# Patient Record
Sex: FEMALE | Race: WHITE | NOT HISPANIC OR LATINO | Employment: STUDENT | URBAN - METROPOLITAN AREA
[De-identification: names, ages, dates, MRNs, and addresses within clinical notes are randomized per-mention and may not be internally consistent; named-entity substitution may affect disease eponyms.]

---
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
## 2022-11-06 ENCOUNTER — OFFICE (OUTPATIENT)
Dept: URBAN - METROPOLITAN AREA CLINIC 92 | Facility: CLINIC | Age: 22
Setting detail: OPHTHALMOLOGY
End: 2022-11-06
Payer: COMMERCIAL

## 2022-11-06 DIAGNOSIS — L71.0: ICD-10-CM

## 2022-11-06 DIAGNOSIS — H02.89: ICD-10-CM

## 2022-11-06 DIAGNOSIS — L20.9: ICD-10-CM

## 2022-11-06 PROCEDURE — 99213 OFFICE O/P EST LOW 20 MIN: CPT | Performed by: OPHTHALMOLOGY

## 2022-11-06 ASSESSMENT — REFRACTION_AUTOREFRACTION
OD_CYLINDER: +0.25
OS_CYLINDER: +0.50
OD_SPHERE: -2.00
OS_SPHERE: -2.00
OS_AXIS: 80
OD_AXIS: 100

## 2022-11-06 ASSESSMENT — SPHEQUIV_DERIVED
OS_SPHEQUIV: -1.75
OD_SPHEQUIV: -1.875

## 2022-11-06 ASSESSMENT — REFRACTION_MANIFEST
OS_SPHERE: -1.50
OS_VA1: 20/25
OS_CYLINDER: SPH
OS_VA1: 20/20-+
OD_VA1: 20/20
OD_CYLINDER: SPH
OS_SPHERE: PLANO
OD_VA1: 20/20
OD_SPHERE: -2.00
OD_SPHERE: PLANO

## 2022-11-06 ASSESSMENT — TONOMETRY
OS_IOP_MMHG: 20
OD_IOP_MMHG: 20

## 2022-11-06 ASSESSMENT — REFRACTION_CURRENTRX
OD_VPRISM_DIRECTION: SV
OS_SPHERE: -2.00
OD_CYLINDER: SPH
OD_SPHERE: -2.25
OD_OVR_VA: 20/
OS_VPRISM_DIRECTION: SV
OS_CYLINDER: SPH
OS_OVR_VA: 20/

## 2022-11-06 ASSESSMENT — LID EXAM ASSESSMENTS
OS_EDEMA: LLL LUL T
OS_MEIBOMITIS: LLL LUL 3+
OD_MEIBOMITIS: RLL RUL 3+
OD_EDEMA: RLL RUL T

## 2022-11-06 ASSESSMENT — VISUAL ACUITY
OD_BCVA: 20/20
OS_BCVA: 20/20